# Patient Record
Sex: FEMALE | Race: OTHER | NOT HISPANIC OR LATINO | URBAN - METROPOLITAN AREA
[De-identification: names, ages, dates, MRNs, and addresses within clinical notes are randomized per-mention and may not be internally consistent; named-entity substitution may affect disease eponyms.]

---

## 2018-10-10 ENCOUNTER — EMERGENCY (EMERGENCY)
Facility: HOSPITAL | Age: 18
LOS: 1 days | Discharge: HOME | End: 2018-10-10
Attending: EMERGENCY MEDICINE | Admitting: EMERGENCY MEDICINE
Payer: COMMERCIAL

## 2018-10-10 VITALS
OXYGEN SATURATION: 100 % | HEART RATE: 107 BPM | SYSTOLIC BLOOD PRESSURE: 132 MMHG | DIASTOLIC BLOOD PRESSURE: 87 MMHG | TEMPERATURE: 99 F | RESPIRATION RATE: 20 BRPM

## 2018-10-10 VITALS
SYSTOLIC BLOOD PRESSURE: 123 MMHG | TEMPERATURE: 99 F | RESPIRATION RATE: 20 BRPM | OXYGEN SATURATION: 100 % | DIASTOLIC BLOOD PRESSURE: 79 MMHG | HEART RATE: 96 BPM

## 2018-10-10 LAB
ALBUMIN SERPL ELPH-MCNC: 4.1 G/DL — SIGNIFICANT CHANGE UP (ref 3.3–5)
ALP SERPL-CCNC: 96 U/L — SIGNIFICANT CHANGE UP (ref 40–120)
ALT FLD-CCNC: 14 U/L — SIGNIFICANT CHANGE UP (ref 4–33)
APPEARANCE UR: CLEAR — SIGNIFICANT CHANGE UP
AST SERPL-CCNC: 19 U/L — SIGNIFICANT CHANGE UP (ref 4–32)
BASOPHILS # BLD AUTO: 0.06 K/UL — SIGNIFICANT CHANGE UP (ref 0–0.2)
BASOPHILS NFR BLD AUTO: 0.5 % — SIGNIFICANT CHANGE UP (ref 0–2)
BILIRUB SERPL-MCNC: 0.3 MG/DL — SIGNIFICANT CHANGE UP (ref 0.2–1.2)
BILIRUB UR-MCNC: NEGATIVE — SIGNIFICANT CHANGE UP
BLOOD UR QL VISUAL: NEGATIVE — SIGNIFICANT CHANGE UP
BUN SERPL-MCNC: 14 MG/DL — SIGNIFICANT CHANGE UP (ref 7–23)
CALCIUM SERPL-MCNC: 9.1 MG/DL — SIGNIFICANT CHANGE UP (ref 8.4–10.5)
CHLORIDE SERPL-SCNC: 103 MMOL/L — SIGNIFICANT CHANGE UP (ref 98–107)
CO2 SERPL-SCNC: 22 MMOL/L — SIGNIFICANT CHANGE UP (ref 22–31)
COLOR SPEC: YELLOW — SIGNIFICANT CHANGE UP
CREAT SERPL-MCNC: 0.83 MG/DL — SIGNIFICANT CHANGE UP (ref 0.5–1.3)
EOSINOPHIL # BLD AUTO: 0.08 K/UL — SIGNIFICANT CHANGE UP (ref 0–0.5)
EOSINOPHIL NFR BLD AUTO: 0.7 % — SIGNIFICANT CHANGE UP (ref 0–6)
GLUCOSE SERPL-MCNC: 87 MG/DL — SIGNIFICANT CHANGE UP (ref 70–99)
GLUCOSE UR-MCNC: NEGATIVE — SIGNIFICANT CHANGE UP
HCT VFR BLD CALC: 37.9 % — SIGNIFICANT CHANGE UP (ref 34.5–45)
HGB BLD-MCNC: 12.1 G/DL — SIGNIFICANT CHANGE UP (ref 11.5–15.5)
IMM GRANULOCYTES # BLD AUTO: 0.03 # — SIGNIFICANT CHANGE UP
IMM GRANULOCYTES NFR BLD AUTO: 0.3 % — SIGNIFICANT CHANGE UP (ref 0–1.5)
KETONES UR-MCNC: NEGATIVE — SIGNIFICANT CHANGE UP
LEUKOCYTE ESTERASE UR-ACNC: NEGATIVE — SIGNIFICANT CHANGE UP
LYMPHOCYTES # BLD AUTO: 2.47 K/UL — SIGNIFICANT CHANGE UP (ref 1–3.3)
LYMPHOCYTES # BLD AUTO: 20.8 % — SIGNIFICANT CHANGE UP (ref 13–44)
MCHC RBC-ENTMCNC: 25.5 PG — LOW (ref 27–34)
MCHC RBC-ENTMCNC: 31.9 % — LOW (ref 32–36)
MCV RBC AUTO: 79.8 FL — LOW (ref 80–100)
MONOCYTES # BLD AUTO: 0.69 K/UL — SIGNIFICANT CHANGE UP (ref 0–0.9)
MONOCYTES NFR BLD AUTO: 5.8 % — SIGNIFICANT CHANGE UP (ref 2–14)
NEUTROPHILS # BLD AUTO: 8.56 K/UL — HIGH (ref 1.8–7.4)
NEUTROPHILS NFR BLD AUTO: 71.9 % — SIGNIFICANT CHANGE UP (ref 43–77)
NITRITE UR-MCNC: NEGATIVE — SIGNIFICANT CHANGE UP
NRBC # FLD: 0 — SIGNIFICANT CHANGE UP
PH UR: 6 — SIGNIFICANT CHANGE UP (ref 5–8)
PLATELET # BLD AUTO: 385 K/UL — SIGNIFICANT CHANGE UP (ref 150–400)
PMV BLD: 10.3 FL — SIGNIFICANT CHANGE UP (ref 7–13)
POTASSIUM SERPL-MCNC: 4.2 MMOL/L — SIGNIFICANT CHANGE UP (ref 3.5–5.3)
POTASSIUM SERPL-SCNC: 4.2 MMOL/L — SIGNIFICANT CHANGE UP (ref 3.5–5.3)
PROT SERPL-MCNC: 8 G/DL — SIGNIFICANT CHANGE UP (ref 6–8.3)
PROT UR-MCNC: 20 — SIGNIFICANT CHANGE UP
RBC # BLD: 4.75 M/UL — SIGNIFICANT CHANGE UP (ref 3.8–5.2)
RBC # FLD: 13.5 % — SIGNIFICANT CHANGE UP (ref 10.3–14.5)
SODIUM SERPL-SCNC: 138 MMOL/L — SIGNIFICANT CHANGE UP (ref 135–145)
SP GR SPEC: 1.03 — SIGNIFICANT CHANGE UP (ref 1–1.04)
UROBILINOGEN FLD QL: NORMAL — SIGNIFICANT CHANGE UP
WBC # BLD: 11.89 K/UL — HIGH (ref 3.8–10.5)
WBC # FLD AUTO: 11.89 K/UL — HIGH (ref 3.8–10.5)

## 2018-10-10 PROCEDURE — 99283 EMERGENCY DEPT VISIT LOW MDM: CPT

## 2018-10-10 RX ORDER — SODIUM CHLORIDE 9 MG/ML
1000 INJECTION INTRAMUSCULAR; INTRAVENOUS; SUBCUTANEOUS ONCE
Qty: 0 | Refills: 0 | Status: COMPLETED | OUTPATIENT
Start: 2018-10-10 | End: 2018-10-10

## 2018-10-10 RX ADMIN — SODIUM CHLORIDE 3000 MILLILITER(S): 9 INJECTION INTRAMUSCULAR; INTRAVENOUS; SUBCUTANEOUS at 13:57

## 2018-10-10 NOTE — ED ADULT NURSE NOTE - OBJECTIVE STATEMENT
patient  Alert & ox3.pt verbalized  abdominal painand diarrhea,  pt . evaluated by MD.Placed 20g angiocath right arm.labs drawn and sent. Patient is comfortable ,IVF NSstarted as per order.  made comfortable.will continue to monitor.

## 2018-10-10 NOTE — ED PROVIDER NOTE - MEDICAL DECISION MAKING DETAILS
17 yo female with diarrhea and abdominal cramping s/p abx use. will obtain labs, u/a and likely d.c home.

## 2018-10-10 NOTE — ED PROVIDER NOTE - OBJECTIVE STATEMENT
19 yo female with a h/o hypothyroidism c/o an episode of cramping lower abdominal pain and watery diarrhea that began this morning after taking azithromycin x 1 dose last night for a UTI? as per patient. diarrhea and pain have now resolved. No fevers, chills, or flank pain. NO vaginal discharge. + recent travel to [facility] in August at which time she was on abx as well. NO known sick contacts. Pt denies dysuria, frequency and urgency.

## 2023-02-22 ENCOUNTER — EMERGENCY (EMERGENCY)
Facility: HOSPITAL | Age: 23
LOS: 1 days | Discharge: HOME | End: 2023-02-22
Attending: STUDENT IN AN ORGANIZED HEALTH CARE EDUCATION/TRAINING PROGRAM
Payer: MEDICAID

## 2023-02-22 VITALS
DIASTOLIC BLOOD PRESSURE: 80 MMHG | RESPIRATION RATE: 18 BRPM | OXYGEN SATURATION: 99 % | TEMPERATURE: 98 F | HEIGHT: 62 IN | WEIGHT: 207.9 LBS | HEART RATE: 96 BPM | SYSTOLIC BLOOD PRESSURE: 116 MMHG

## 2023-02-22 PROBLEM — E03.9 HYPOTHYROIDISM, UNSPECIFIED: Chronic | Status: ACTIVE | Noted: 2018-10-10

## 2023-02-22 PROCEDURE — 99284 EMERGENCY DEPT VISIT MOD MDM: CPT

## 2023-02-22 PROCEDURE — 87801 DETECT AGNT MULT DNA AMPLI: CPT

## 2023-02-22 PROCEDURE — 87563 M. GENITALIUM AMP PROBE: CPT

## 2023-02-22 PROCEDURE — 87798 DETECT AGENT NOS DNA AMP: CPT

## 2023-02-22 PROCEDURE — 87661 TRICHOMONAS VAGINALIS AMPLIF: CPT

## 2023-02-22 PROCEDURE — 87491 CHLMYD TRACH DNA AMP PROBE: CPT

## 2023-02-22 PROCEDURE — 87591 N.GONORRHOEAE DNA AMP PROB: CPT

## 2023-02-22 RX ORDER — METRONIDAZOLE 500 MG
500 TABLET ORAL ONCE
Refills: 0 | Status: COMPLETED | OUTPATIENT
Start: 2023-02-22 | End: 2023-02-22

## 2023-02-22 RX ORDER — METRONIDAZOLE 500 MG
1 TABLET ORAL
Qty: 14 | Refills: 0
Start: 2023-02-22 | End: 2023-02-28

## 2023-02-22 RX ADMIN — Medication 500 MILLIGRAM(S): at 14:24

## 2023-02-22 NOTE — ED PROVIDER NOTE - NSFOLLOWUPINSTRUCTIONS_ED_ALL_ED_FT
Vaginitis       Vaginitis is irritation and swelling of the vagina. Treatment will depend on the cause.      What are the causes?    It can be caused by:  •Bacteria.      •Yeast.      •A parasite.      •A virus.      •Low hormone levels.      •Bubble baths, scented tampons, and feminine sprays.      Other things can change the balance of the yeast and bacteria that live in the vagina. These include:  •Antibiotic medicines.      •Not being clean enough.      •Some birth control methods.      •Sex.      •Infection.      •Diabetes.      •A weakened body defense system (immune system).        What increases the risk?    •Smoking or being around someone who smokes.      •Using washes (douches), scented tampons, or scented pads.      •Wearing tight pants or thong underwear.      •Using birth control pills or an IUD.      •Having sex without a condom or having a lot of partners.      •Having an STI.      •Using a certain product to kill sperm (nonoxynol-9).      •Eating foods that are high in sugar.      •Having diabetes.      •Having low levels of a female hormone.      •Having a weakened body defense system.      •Being pregnant or breastfeeding.        What are the signs or symptoms?    •Fluid coming from the vagina that is not normal.      •A bad smell.      •Itching, pain, or swelling.      •Pain with sex.      •Pain or burning when you pee (urinate).      Sometimes there are no symptoms.      How is this treated?    Treatment may include:  •Antibiotic creams or pills.      •Antifungal medicines.      •Medicines to ease symptoms if you have a virus. Your sex partner should also be treated.      •Estrogen medicines.      •Avoiding scented soaps, sprays, or douches.      •Stopping use of products that caused irritation and then using a cream to treat symptoms.        Follow these instructions at home:    Lifestyle   •Keep the area around your vagina clean and dry.  •Avoid using soap.      •Rinse the area with water.      •Until your doctor says it is okay:  •Do not use washes for the vagina.      •Do not use tampons.      •Do not have sex.        •Wipe from front to back after going to the bathroom.      •When your doctor says it is okay, practice safe sex and use condoms.      General instructions     •Take over-the-counter and prescription medicines only as told by your doctor.      •If you were prescribed an antibiotic medicine, take or use it as told by your doctor. Do not stop taking or using it even if you start to feel better.      •Keep all follow-up visits.        How is this prevented?  • Do not use things that can irritate the vagina, such as fabric softeners. Avoid these products if they are scented:  •Sprays.      •Detergents.      •Tampons.      •Products for cleaning the vagina.      •Soaps or bubble baths.      •Let air reach your vagina. To do this:  •Wear cotton underwear.    •Do not wear:  •Underwear while you sleep.      •Tight pants.      •Thong underwear.      •Underwear or nylons without a cotton panel.        •Take off any wet clothing, such as bathing suits, as soon as you can.      •Practice safe sex and use condoms.          Contact a doctor if:    •You have pain in your belly or in the area between your hips.      •You have a fever or chills.      •Your symptoms last for more than 2–3 days.        Get help right away if:    •You have a fever and your symptoms get worse all of a sudden.        Summary    •Vaginitis is irritation and swelling of the vagina.      •Treatment will depend on the cause of the condition.      • Do not use washes or tampons or have sex until your doctor says it is okay.

## 2023-02-22 NOTE — ED PROVIDER NOTE - PHYSICAL EXAMINATION
Well-appearing. GYn exam - chaperone Jaylan Villegas RN noted irritation around the cervix consistent with strawberry cervix. Moderate amt of white/gray discharge noted - thin. No concern for "cheese" like discharge

## 2023-02-22 NOTE — ED PROVIDER NOTE - PATIENT PORTAL LINK FT
You can access the FollowMyHealth Patient Portal offered by [facility] by registering at the following website: [email or web address]. By joining [employer]’s FollowMyHealth portal, you will also be able to view your health information using other applications (apps) compatible with our system.

## 2023-02-22 NOTE — ED PROVIDER NOTE - CLINICAL SUMMARY MEDICAL DECISION MAKING FREE TEXT BOX
22-year-old female with past medical history presents with few days of burning sensation in her vagina.  concern fro BV vs trich.

## 2023-02-22 NOTE — ED ADULT NURSE NOTE - NSIMPLEMENTINTERV_GEN_ALL_ED
How Severe Are Your Spot(S)?: mild Implemented All Universal Safety Interventions:  [place] to call system. Call bell, personal items and telephone within reach. Instruct patient to call for assistance. Room bathroom lighting operational. Non-slip footwear when patient is off stretcher. Physically safe environment: no spills, clutter or unnecessary equipment. Stretcher in lowest position, wheels locked, appropriate side rails in place. What Is The Reason For Today's Visit?: Upper Body Skin Exam Additional History: Patient reports a history of sun poisoning on her chest. She also has dark spots on her arms that she would like checked.

## 2023-02-22 NOTE — ED PROVIDER NOTE - OBJECTIVE STATEMENT
22-year-old female with past medical history presents with few days of burning sensation in her vagina.  Denies itchiness.  Denies dysuria.  Denies unusual discharge.  States she is sexually active usually use protection.  Never been treated for STDs in the past.  Last STD check was 3 years ago.

## 2023-02-23 LAB
C TRACH RRNA SPEC QL NAA+PROBE: SIGNIFICANT CHANGE UP
N GONORRHOEA RRNA SPEC QL NAA+PROBE: SIGNIFICANT CHANGE UP

## 2023-03-01 LAB
A VAGINAE DNA VAG QL NAA+PROBE: SIGNIFICANT CHANGE UP
BVAB2 DNA VAG QL NAA+PROBE: SIGNIFICANT CHANGE UP
C ALBICANS DNA VAG QL NAA+PROBE: NEGATIVE — SIGNIFICANT CHANGE UP
C GLABRATA DNA VAG QL NAA+PROBE: NEGATIVE — SIGNIFICANT CHANGE UP
M GENITALIUM DNA SPEC QL NAA+PROBE: NEGATIVE — SIGNIFICANT CHANGE UP
M HOMINIS DNA SPEC QL NAA+PROBE: NEGATIVE — SIGNIFICANT CHANGE UP
MEGA1 DNA VAG QL NAA+PROBE: SIGNIFICANT CHANGE UP
T VAGINALIS RRNA SPEC QL NAA+PROBE: NEGATIVE — SIGNIFICANT CHANGE UP
UREAPLASMA DNA SPEC QL NAA+PROBE: NEGATIVE — SIGNIFICANT CHANGE UP
